# Patient Record
Sex: FEMALE | Race: WHITE | Employment: FULL TIME | URBAN - METROPOLITAN AREA
[De-identification: names, ages, dates, MRNs, and addresses within clinical notes are randomized per-mention and may not be internally consistent; named-entity substitution may affect disease eponyms.]

---

## 2017-06-05 RX ORDER — CITALOPRAM 20 MG/1
20 TABLET ORAL DAILY
Qty: 30 TABLET | Refills: 0 | Status: SHIPPED | OUTPATIENT
Start: 2017-06-05 | End: 2017-06-23 | Stop reason: ALTCHOICE

## 2017-06-23 ENCOUNTER — OFFICE VISIT (OUTPATIENT)
Dept: FAMILY MEDICINE CLINIC | Age: 22
End: 2017-06-23
Payer: COMMERCIAL

## 2017-06-23 VITALS
HEIGHT: 69 IN | RESPIRATION RATE: 18 BRPM | DIASTOLIC BLOOD PRESSURE: 72 MMHG | SYSTOLIC BLOOD PRESSURE: 120 MMHG | WEIGHT: 293 LBS | HEART RATE: 82 BPM | BODY MASS INDEX: 43.4 KG/M2

## 2017-06-23 DIAGNOSIS — G47.19 EXCESSIVE DAYTIME SLEEPINESS: ICD-10-CM

## 2017-06-23 DIAGNOSIS — E03.9 HYPOTHYROIDISM, UNSPECIFIED TYPE: ICD-10-CM

## 2017-06-23 DIAGNOSIS — R53.83 OTHER FATIGUE: ICD-10-CM

## 2017-06-23 DIAGNOSIS — Z51.81 MEDICATION MONITORING ENCOUNTER: ICD-10-CM

## 2017-06-23 DIAGNOSIS — Z00.01 ENCOUNTER FOR GENERAL ADULT MEDICAL EXAMINATION WITH ABNORMAL FINDINGS: Primary | ICD-10-CM

## 2017-06-23 DIAGNOSIS — Z30.09 ENCOUNTER FOR COUNSELING REGARDING CONTRACEPTION: ICD-10-CM

## 2017-06-23 DIAGNOSIS — E66.01 MORBID OBESITY DUE TO EXCESS CALORIES (HCC): ICD-10-CM

## 2017-06-23 DIAGNOSIS — Z13.220 SCREENING FOR LIPID DISORDERS: ICD-10-CM

## 2017-06-23 PROCEDURE — 99395 PREV VISIT EST AGE 18-39: CPT | Performed by: FAMILY MEDICINE

## 2017-06-23 RX ORDER — ETONOGESTREL AND ETHINYL ESTRADIOL 11.7; 2.7 MG/1; MG/1
1 INSERT, EXTENDED RELEASE VAGINAL
Qty: 3 EACH | Refills: 3 | Status: SHIPPED | OUTPATIENT
Start: 2017-06-23 | End: 2017-09-26

## 2017-06-23 RX ORDER — ETONOGESTREL AND ETHINYL ESTRADIOL 11.7; 2.7 MG/1; MG/1
1 INSERT, EXTENDED RELEASE VAGINAL
Qty: 3 EACH | Refills: 3 | Status: SHIPPED | OUTPATIENT
Start: 2017-06-23 | End: 2017-06-23 | Stop reason: SDUPTHER

## 2017-06-23 RX ORDER — CITALOPRAM 20 MG/1
TABLET ORAL
Refills: 0 | COMMUNITY
Start: 2017-06-05 | End: 2017-06-23

## 2017-06-23 ASSESSMENT — ENCOUNTER SYMPTOMS
EYE PAIN: 0
SHORTNESS OF BREATH: 0
ABDOMINAL DISTENTION: 0
COLOR CHANGE: 0
SORE THROAT: 0
BLOOD IN STOOL: 0
EYE DISCHARGE: 0
NAUSEA: 1
VOICE CHANGE: 0
FACIAL SWELLING: 0
RHINORRHEA: 0
EYE ITCHING: 0
CONSTIPATION: 0
EYE REDNESS: 0
PHOTOPHOBIA: 0
CHEST TIGHTNESS: 0
ABDOMINAL PAIN: 0
DIARRHEA: 0
BACK PAIN: 0
WHEEZING: 0
COUGH: 0
SINUS PRESSURE: 0
VOMITING: 0

## 2017-06-23 ASSESSMENT — PATIENT HEALTH QUESTIONNAIRE - PHQ9
SUM OF ALL RESPONSES TO PHQ QUESTIONS 1-9: 0
SUM OF ALL RESPONSES TO PHQ9 QUESTIONS 1 & 2: 0
2. FEELING DOWN, DEPRESSED OR HOPELESS: 0
1. LITTLE INTEREST OR PLEASURE IN DOING THINGS: 0

## 2017-06-26 ENCOUNTER — TELEPHONE (OUTPATIENT)
Dept: FAMILY MEDICINE CLINIC | Age: 22
End: 2017-06-26

## 2017-07-10 RX ORDER — CITALOPRAM 20 MG/1
TABLET ORAL
Qty: 90 TABLET | Refills: 3 | Status: SHIPPED | OUTPATIENT
Start: 2017-07-10 | End: 2017-09-26 | Stop reason: ALTCHOICE

## 2017-07-13 DIAGNOSIS — Z30.09 ENCOUNTER FOR COUNSELING REGARDING CONTRACEPTION: ICD-10-CM

## 2017-07-13 DIAGNOSIS — Z51.81 MEDICATION MONITORING ENCOUNTER: ICD-10-CM

## 2017-07-13 DIAGNOSIS — E66.01 MORBID OBESITY DUE TO EXCESS CALORIES (HCC): ICD-10-CM

## 2017-07-13 DIAGNOSIS — E03.9 HYPOTHYROIDISM, UNSPECIFIED TYPE: ICD-10-CM

## 2017-07-13 DIAGNOSIS — R53.83 OTHER FATIGUE: ICD-10-CM

## 2017-07-13 DIAGNOSIS — Z13.220 SCREENING FOR LIPID DISORDERS: ICD-10-CM

## 2017-07-13 LAB
ALBUMIN SERPL-MCNC: 3.9 G/DL
ALP BLD-CCNC: 48 U/L
ALT SERPL-CCNC: 17 U/L
AST SERPL-CCNC: 13 U/L
BILIRUB SERPL-MCNC: 0.5 MG/DL (ref 0.1–1.4)
BUN BLDV-MCNC: 10 MG/DL
CALCIUM SERPL-MCNC: 9.1 MG/DL
CHLORIDE BLD-SCNC: 109 MMOL/L
CHOLESTEROL, TOTAL: 162 MG/DL
CHOLESTEROL/HDL RATIO: 3.6
CO2: 20 MMOL/L
CORTISOL: 8.7
CREAT SERPL-MCNC: 0.65 MG/DL
GFR CALCULATED: 126
GLUCOSE BLD-MCNC: 93 MG/DL
HDLC SERPL-MCNC: 45 MG/DL (ref 35–70)
LDL CHOLESTEROL CALCULATED: 93 MG/DL (ref 0–160)
POTASSIUM SERPL-SCNC: 4.3 MMOL/L
SODIUM BLD-SCNC: 141 MMOL/L
T4 FREE: 1.2
TOTAL PROTEIN: 6.8
TRIGL SERPL-MCNC: 118 MG/DL
TSH SERPL DL<=0.05 MIU/L-ACNC: 3.37 UIU/ML
VLDLC SERPL CALC-MCNC: NORMAL MG/DL

## 2017-08-19 ENCOUNTER — HOSPITAL ENCOUNTER (OUTPATIENT)
Dept: SLEEP CENTER | Age: 22
Discharge: HOME | End: 2017-08-19
Payer: COMMERCIAL

## 2017-08-19 DIAGNOSIS — G47.33 OBSTRUCTIVE SLEEP APNEA SYNDROME: Primary | ICD-10-CM

## 2017-08-19 PROCEDURE — G0399 HOME SLEEP TEST/TYPE 3 PORTA: HCPCS

## 2017-09-11 DIAGNOSIS — R53.83 FATIGUE, UNSPECIFIED TYPE: ICD-10-CM

## 2017-09-11 DIAGNOSIS — G47.19 EXCESSIVE DAYTIME SLEEPINESS: ICD-10-CM

## 2017-09-26 ENCOUNTER — OFFICE VISIT (OUTPATIENT)
Dept: FAMILY MEDICINE CLINIC | Age: 22
End: 2017-09-26
Payer: COMMERCIAL

## 2017-09-26 VITALS
OXYGEN SATURATION: 97 % | HEART RATE: 83 BPM | WEIGHT: 293 LBS | DIASTOLIC BLOOD PRESSURE: 80 MMHG | BODY MASS INDEX: 43.4 KG/M2 | SYSTOLIC BLOOD PRESSURE: 124 MMHG | HEIGHT: 69 IN

## 2017-09-26 DIAGNOSIS — R51.9 HEADACHE, UNSPECIFIED HEADACHE TYPE: Primary | ICD-10-CM

## 2017-09-26 PROCEDURE — 99213 OFFICE O/P EST LOW 20 MIN: CPT | Performed by: FAMILY MEDICINE

## 2017-09-26 RX ORDER — PREDNISONE 50 MG/1
50 TABLET ORAL EVERY MORNING
Qty: 10 TABLET | Refills: 0 | Status: SHIPPED | OUTPATIENT
Start: 2017-09-26 | End: 2017-10-06

## 2017-09-26 RX ORDER — DIAZEPAM 2 MG/1
TABLET ORAL
Qty: 15 TABLET | Refills: 0 | Status: SHIPPED | OUTPATIENT
Start: 2017-09-26 | End: 2019-01-18 | Stop reason: ALTCHOICE

## 2017-09-26 RX ORDER — PROMETHAZINE HYDROCHLORIDE 12.5 MG/1
12.5-25 TABLET ORAL EVERY 6 HOURS PRN
Qty: 60 TABLET | Refills: 0 | Status: SHIPPED | OUTPATIENT
Start: 2017-09-26 | End: 2019-01-18 | Stop reason: ALTCHOICE

## 2017-09-26 RX ORDER — CEFDINIR 300 MG/1
300 CAPSULE ORAL 2 TIMES DAILY
Qty: 20 CAPSULE | Refills: 0 | Status: SHIPPED | OUTPATIENT
Start: 2017-09-26 | End: 2017-10-06

## 2017-09-26 ASSESSMENT — ENCOUNTER SYMPTOMS
DIARRHEA: 0
EYE ITCHING: 0
SORE THROAT: 0
SHORTNESS OF BREATH: 0
EYE DISCHARGE: 0
CONSTIPATION: 0
NAUSEA: 0
SINUS PRESSURE: 0
CHEST TIGHTNESS: 0
EYE REDNESS: 0
ABDOMINAL DISTENTION: 0
COLOR CHANGE: 0
VOICE CHANGE: 0
EYE PAIN: 0
BACK PAIN: 0
ABDOMINAL PAIN: 0
WHEEZING: 0
RHINORRHEA: 0
COUGH: 0
VOMITING: 0
BLOOD IN STOOL: 0
FACIAL SWELLING: 0
PHOTOPHOBIA: 0

## 2017-09-27 ENCOUNTER — TELEPHONE (OUTPATIENT)
Dept: FAMILY MEDICINE CLINIC | Age: 22
End: 2017-09-27

## 2017-09-27 DIAGNOSIS — R51.9 HEADACHE, UNSPECIFIED HEADACHE TYPE: Primary | ICD-10-CM

## 2017-09-29 ENCOUNTER — PATIENT MESSAGE (OUTPATIENT)
Dept: FAMILY MEDICINE CLINIC | Age: 22
End: 2017-09-29

## 2017-09-29 DIAGNOSIS — G43.909 MIGRAINE WITHOUT STATUS MIGRAINOSUS, NOT INTRACTABLE, UNSPECIFIED MIGRAINE TYPE: Primary | ICD-10-CM

## 2017-09-29 DIAGNOSIS — R51.9 HEADACHE, UNSPECIFIED HEADACHE TYPE: ICD-10-CM

## 2017-09-29 RX ORDER — SUMATRIPTAN 100 MG/1
100 TABLET, FILM COATED ORAL
Qty: 9 TABLET | Refills: 3 | Status: SHIPPED | OUTPATIENT
Start: 2017-09-29 | End: 2019-01-18 | Stop reason: ALTCHOICE

## 2017-09-30 ENCOUNTER — HOSPITAL ENCOUNTER (EMERGENCY)
Age: 22
Discharge: HOME | End: 2017-09-30
Attending: EMERGENCY MEDICINE
Payer: COMMERCIAL

## 2017-09-30 VITALS
BODY MASS INDEX: 43.4 KG/M2 | HEIGHT: 69 IN | RESPIRATION RATE: 16 BRPM | HEART RATE: 72 BPM | SYSTOLIC BLOOD PRESSURE: 140 MMHG | DIASTOLIC BLOOD PRESSURE: 77 MMHG | WEIGHT: 293 LBS | TEMPERATURE: 99 F | OXYGEN SATURATION: 97 %

## 2017-09-30 DIAGNOSIS — G89.29 CHRONIC NONINTRACTABLE HEADACHE, UNSPECIFIED HEADACHE TYPE: Primary | ICD-10-CM

## 2017-09-30 DIAGNOSIS — R51.9 CHRONIC NONINTRACTABLE HEADACHE, UNSPECIFIED HEADACHE TYPE: Primary | ICD-10-CM

## 2017-09-30 LAB
ABSOLUTE EOS #: 0 K/UL (ref 0–0.4)
ABSOLUTE LYMPH #: 1.2 K/UL (ref 1–4.8)
ABSOLUTE MONO #: 0.2 K/UL (ref 0.1–1.2)
ANION GAP SERPL CALCULATED.3IONS-SCNC: 13 MMOL/L (ref 9–17)
BASOPHILS # BLD: 0 %
BASOPHILS ABSOLUTE: 0 K/UL (ref 0–0.2)
BILIRUBIN URINE: NEGATIVE
BUN BLDV-MCNC: 15 MG/DL (ref 6–20)
BUN/CREAT BLD: ABNORMAL (ref 9–20)
C-REACTIVE PROTEIN: 3.6 MG/L (ref 0–5)
CALCIUM SERPL-MCNC: 9.5 MG/DL (ref 8.6–10.4)
CHLORIDE BLD-SCNC: 102 MMOL/L (ref 98–107)
CO2: 22 MMOL/L (ref 20–31)
COLOR: YELLOW
COMMENT UA: NORMAL
CREAT SERPL-MCNC: 0.6 MG/DL (ref 0.5–0.9)
DIFFERENTIAL TYPE: ABNORMAL
EOSINOPHILS RELATIVE PERCENT: 0 %
GFR AFRICAN AMERICAN: >60 ML/MIN
GFR NON-AFRICAN AMERICAN: >60 ML/MIN
GFR SERPL CREATININE-BSD FRML MDRD: ABNORMAL ML/MIN/{1.73_M2}
GFR SERPL CREATININE-BSD FRML MDRD: ABNORMAL ML/MIN/{1.73_M2}
GLUCOSE BLD-MCNC: 117 MG/DL (ref 70–99)
GLUCOSE URINE: NEGATIVE
HCG QUALITATIVE: NEGATIVE
HCT VFR BLD CALC: 42.9 % (ref 36–46)
HEMOGLOBIN: 14.3 G/DL (ref 12–16)
KETONES, URINE: NEGATIVE
LEUKOCYTE ESTERASE, URINE: NEGATIVE
LYMPHOCYTES # BLD: 10 %
MAGNESIUM: 2.3 MG/DL (ref 1.6–2.6)
MCH RBC QN AUTO: 29.1 PG (ref 26–34)
MCHC RBC AUTO-ENTMCNC: 33.3 G/DL (ref 31–37)
MCV RBC AUTO: 87.5 FL (ref 80–100)
MONOCYTES # BLD: 1 %
NITRITE, URINE: NEGATIVE
PDW BLD-RTO: 14.1 % (ref 12.5–15.4)
PH UA: 5.5 (ref 5–8)
PLATELET # BLD: 281 K/UL (ref 140–450)
PLATELET ESTIMATE: ABNORMAL
PMV BLD AUTO: 8.3 FL (ref 6–12)
POTASSIUM SERPL-SCNC: 4 MMOL/L (ref 3.7–5.3)
PROTEIN UA: NEGATIVE
RBC # BLD: 4.9 M/UL (ref 4–5.2)
RBC # BLD: ABNORMAL 10*6/UL
SEG NEUTROPHILS: 89 %
SEGMENTED NEUTROPHILS ABSOLUTE COUNT: 9.8 K/UL (ref 1.8–7.7)
SODIUM BLD-SCNC: 137 MMOL/L (ref 135–144)
SPECIFIC GRAVITY UA: 1.03 (ref 1–1.03)
TURBIDITY: CLEAR
URINE HGB: NEGATIVE
UROBILINOGEN, URINE: NORMAL
WBC # BLD: 11.2 K/UL (ref 3.5–11)
WBC # BLD: ABNORMAL 10*3/UL

## 2017-09-30 PROCEDURE — 99283 EMERGENCY DEPT VISIT LOW MDM: CPT

## 2017-09-30 PROCEDURE — 81003 URINALYSIS AUTO W/O SCOPE: CPT

## 2017-09-30 PROCEDURE — 83735 ASSAY OF MAGNESIUM: CPT

## 2017-09-30 PROCEDURE — 2580000003 HC RX 258: Performed by: EMERGENCY MEDICINE

## 2017-09-30 PROCEDURE — 86140 C-REACTIVE PROTEIN: CPT

## 2017-09-30 PROCEDURE — 80048 BASIC METABOLIC PNL TOTAL CA: CPT

## 2017-09-30 PROCEDURE — 96365 THER/PROPH/DIAG IV INF INIT: CPT

## 2017-09-30 PROCEDURE — 6360000002 HC RX W HCPCS: Performed by: EMERGENCY MEDICINE

## 2017-09-30 PROCEDURE — 96375 TX/PRO/DX INJ NEW DRUG ADDON: CPT

## 2017-09-30 PROCEDURE — 84703 CHORIONIC GONADOTROPIN ASSAY: CPT

## 2017-09-30 PROCEDURE — 85025 COMPLETE CBC W/AUTO DIFF WBC: CPT

## 2017-09-30 PROCEDURE — 96361 HYDRATE IV INFUSION ADD-ON: CPT

## 2017-09-30 RX ORDER — MAGNESIUM SULFATE 1 G/100ML
1 INJECTION INTRAVENOUS
Status: COMPLETED | OUTPATIENT
Start: 2017-09-30 | End: 2017-09-30

## 2017-09-30 RX ORDER — DIPHENHYDRAMINE HYDROCHLORIDE 50 MG/ML
25 INJECTION INTRAMUSCULAR; INTRAVENOUS ONCE
Status: COMPLETED | OUTPATIENT
Start: 2017-09-30 | End: 2017-09-30

## 2017-09-30 RX ORDER — 0.9 % SODIUM CHLORIDE 0.9 %
1000 INTRAVENOUS SOLUTION INTRAVENOUS ONCE
Status: COMPLETED | OUTPATIENT
Start: 2017-09-30 | End: 2017-09-30

## 2017-09-30 RX ADMIN — SODIUM CHLORIDE 1000 ML: 9 INJECTION, SOLUTION INTRAVENOUS at 14:05

## 2017-09-30 RX ADMIN — DIPHENHYDRAMINE HYDROCHLORIDE 25 MG: 50 INJECTION INTRAMUSCULAR; INTRAVENOUS at 14:05

## 2017-09-30 RX ADMIN — PROCHLORPERAZINE EDISYLATE 10 MG: 5 INJECTION INTRAMUSCULAR; INTRAVENOUS at 14:06

## 2017-09-30 RX ADMIN — MAGNESIUM SULFATE IN DEXTROSE 1 G: 10 INJECTION, SOLUTION INTRAVENOUS at 15:00

## 2017-09-30 RX ADMIN — MAGNESIUM SULFATE IN DEXTROSE 1 G: 10 INJECTION, SOLUTION INTRAVENOUS at 14:06

## 2017-09-30 ASSESSMENT — ENCOUNTER SYMPTOMS
WHEEZING: 0
PHOTOPHOBIA: 1
SHORTNESS OF BREATH: 0
BLOOD IN STOOL: 0
EYE REDNESS: 0
NAUSEA: 1
COUGH: 0
TROUBLE SWALLOWING: 0
BACK PAIN: 0
VOMITING: 0
ABDOMINAL PAIN: 0
SORE THROAT: 0
DIARRHEA: 0
CHEST TIGHTNESS: 0

## 2017-09-30 ASSESSMENT — PAIN DESCRIPTION - PAIN TYPE: TYPE: ACUTE PAIN

## 2017-09-30 ASSESSMENT — PAIN DESCRIPTION - DESCRIPTORS: DESCRIPTORS: STABBING;THROBBING

## 2017-09-30 ASSESSMENT — PAIN SCALES - GENERAL: PAINLEVEL_OUTOF10: 9

## 2017-09-30 ASSESSMENT — PAIN DESCRIPTION - LOCATION: LOCATION: HEAD

## 2017-09-30 NOTE — ED AVS SNAPSHOT
, and Debby Garcia DO. Follow-up Appointments    Below is a list of your follow-up and future appointments. This may not be a complete list as you may have made appointments directly with providers that we are not aware of or your providers may have made some for you. Please call your providers to confirm appointments. It is important to keep your appointments. Please bring your current insurance card, photo ID, co-pay, and all medication bottles to your appointment. If self-pay, payment is expected at the time of service. Follow-up Information     Follow up with Dionna Donahue MD.    Specialty:  Family Medicine    Contact information:    [address] [facility] [address] [facility] [address]          Go to [facility] ED. Specialty:  Emergency Medicine    Why:  If symptoms worsen    Contact information:    [address] [facility] [address]  [phone]      Preventive Care        Date Due    Tetanus Combination Vaccine (6 - Tdap) 11/8/2005    HIV screening is recommended for all people regardless of risk factors  aged 15-65 years at least once (lifetime) who have never been HIV tested. 11/8/2009    Pap Smear 11/8/2015    Yearly Flu Vaccine (1) 9/1/2017                 Care Plan Once You Return Home    This section includes instructions you will need to follow once you leave the hospital.  Your care team will discuss these with you, so you and those caring for you know how to best care for your health needs at home. This section may also include educational information about certain health topics that may be of help to you. Important Information if you smoke or are exposed to smoking       SMOKING: QUIT SMOKING. THIS IS THE MOST IMPORTANT ACTION YOU CAN TAKE TO IMPROVE YOUR CURRENT AND FUTURE HEALTH.      Call the [address] [place] [address] at [place] NOW ([phone]) The information on all pages of the After Visit Summary has been reviewed with me, the patient and/or responsible adult, by my health care provider(s). I had the opportunity to ask questions regarding this information. I understand I should dispose of my armband safely at home to protect my health information. A complete copy of the After Visit Summary has been given to me, the patient and/or responsible adult. Patient Signature/Responsible Adult: ___________________________________    Nurse Signature: ___________________________________  Resident/MLP Signature: ___________________________________  Attending Signature: ___________________________________    Date:____________Time:____________              Discharge Instructions            Headache: Care Instructions  Your Care Instructions    Headaches have many possible causes. Most headaches aren't a sign of a more serious problem, and they will get better on their own. Home treatment may help you feel better faster. The doctor has checked you carefully, but problems can develop later. If you notice any problems or new symptoms, get medical treatment right away. Follow-up care is a key part of your treatment and safety. Be sure to make and go to all appointments, and call your doctor if you are having problems. It's also a good idea to know your test results and keep a list of the medicines you take. How can you care for yourself at home? · Do not drive if you have taken a prescription pain medicine. · Rest in a quiet, dark room until your headache is gone. Close your eyes and try to relax or go to sleep. Don't watch TV or read. · Put a cold, moist cloth or cold pack on the painful area for 10 to 20 minutes at a time. Put a thin cloth between the cold pack and your skin. · Use a warm, moist towel or a heating pad set on low to relax tight shoulder and neck muscles. · Have someone gently massage your neck and shoulders. · Take pain medicines exactly as directed. ¨ If the doctor gave you a prescription medicine for pain, take it as prescribed. ¨ If you are not taking a prescription pain medicine, ask your doctor if you can take an over-the-counter medicine. · Be careful not to take pain medicine more often than the instructions allow, because you may get worse or more frequent headaches when the medicine wears off. · Do not ignore new symptoms that occur with a headache, such as a fever, weakness or numbness, vision changes, or confusion. These may be signs of a more serious problem. To prevent headaches  · Keep a headache diary so you can figure out what triggers your headaches. Avoiding triggers may help you prevent headaches. Record when each headache began, how long it lasted, and what the pain was like (throbbing, aching, stabbing, or dull). Write down any other symptoms you had with the headache, such as nausea, flashing lights or dark spots, or sensitivity to bright light or loud noise. Note if the headache occurred near your period. List anything that might have triggered the headache, such as certain foods (chocolate, cheese, wine) or odors, smoke, bright light, stress, or lack of sleep. · Find healthy ways to deal with stress. Headaches are most common during or right after stressful times. Take time to relax before and after you do something that has caused a headache in the past.  · Try to keep your muscles relaxed by keeping good posture. Check your jaw, face, neck, and shoulder muscles for tension, and try relaxing them. When sitting at a desk, change positions often, and stretch for 30 seconds each hour. · Get plenty of sleep and exercise. · Eat regularly and well. Long periods without food can trigger a headache. · Treat yourself to a massage. Some people find that regular massages are very helpful in relieving tension. · Limit caffeine by not drinking too much coffee, tea, or soda.  But don't

## 2017-09-30 NOTE — ED PROVIDER NOTES
[facility]  ED  eMERGENCY dEPARTMENT eNCOUnter   Attending Attestation     Pt Name: Amparo Travis  MRN: [id number]  [place] 1995  Date of evaluation: 9/30/17       Amparo Travis is a 24 y.o. female who presents with Headache      History: Pt presents with HA for two weeks. Has had CT, steroids, imitrex. Nothing has made it better. Pt is here for further evaluation. Exam: Physical Exam   Constitutional: She is oriented to person, place, and time and well-developed, well-nourished, and in no distress. Pt is obese. HENT:   Head: Normocephalic and atraumatic. Eyes: EOM are normal. Pupils are equal, round, and reactive to light. Right eye exhibits no discharge. Left eye exhibits no discharge. Neck: Normal range of motion. No JVD present. No tracheal deviation present. Cardiovascular: Normal rate, regular rhythm, normal heart sounds and intact distal pulses. Exam reveals no gallop and no friction rub. No murmur heard. Pulmonary/Chest: Effort normal and breath sounds normal. No respiratory distress. She has no wheezes. She has no rales. Abdominal: Soft. Bowel sounds are normal. She exhibits no distension and no mass. There is no tenderness. There is no rebound and no guarding. Musculoskeletal: Normal range of motion. She exhibits no edema or tenderness. Neurological: She is alert and oriented to person, place, and time. No cranial nerve deficit. Coordination normal. GCS score is 15. Skin: Skin is warm and dry. No rash noted. She is not diaphoretic. No erythema. Psychiatric: Mood and affect normal.     Will get labs, U/A, review CT from yesterday. Will consider LP for Idiopathic intracranial hypertension and admission if pt is not improved. Pt received compazine and benadryl, pt had akathisic reaction while I was in the room. Pt improved and felt improved after this.      I performed a history and physical examination of the patient and discussed management with the

## 2017-09-30 NOTE — ED NOTES
While Dr. Jajyay Dietrich was examining pt, pt started experiencing a lot of anxiety. Pt was crying and tachypneic and tachycardic at 120s. Per Dr. Jayjay Dietrich, possible adverse reaction to compazine administration. Writer diluted medication and administered slowly from the top IV port. Pt is now calm and resting comfortably. Pt is no long tachypneic or tachycardic. Will continue to monitor.        Norma Lee RN  09/30/17 1829

## 2017-09-30 NOTE — ED PROVIDER NOTES
[facility] ED  Emergency Department Encounter  Emergency Medicine Resident     Pt Name: Oma Rabago  MRN: [id number]  [place] 1995  Date of evaluation: 9/30/17  PCP:  Sharda Jones MD    [address] [place] [address]       Chief Complaint   Patient presents with    Headache       HISTORY OF PRESENT ILLNESS  (Location/Symptom, Timing/Onset, Context/Setting, Quality, Duration, Modifying Factors, Severity.)      Oma Rabago is a 24 y.o. female who presents with Headache. Patient states she's had a headache for the last 2 weeks now. She states she has followed up with her primary care doctor and tried multiple medications including antihistamines, sinus medications, steroids. She recently was placed on Imitrex and given outpatient referral to neurology who she has not yet had time to follow-up with. She also had an outpatient CT scan done of her brain which was negative. She denies any fevers or chills or neck pain. Denies any chest pain or shortness of breath. Denies any recent illnesses, coughs or colds. She denies any worsening of her headache with positional changes. She does note photophobia and nausea without vomiting. Denies any prior history of migraines prior to this episode that started 2 weeks ago. PAST MEDICAL / SURGICAL / SOCIAL / FAMILY HISTORY      has no past medical history on file. has a past surgical history that includes Tonsillectomy. Social History     Social History    Marital status: Single     Spouse name: N/A    Number of children: N/A    Years of education: N/A     Occupational History    Not on file. Social History Main Topics    Smoking status: Never Smoker    Smokeless tobacco: Never Used    Alcohol use 0.0 oz/week     0 Standard drinks or equivalent per week      Comment: social    Drug use: No    Sexual activity: Not on file     Other Topics Concern    Not on file     Social History Narrative       History reviewed.  No pertinent family systems reviewed and are negative. PHYSICAL EXAM   (up to 7 for level 4, 8 or more for level 5)      INITIAL VITALS:   BP (!) 140/77  Pulse 72  Temp 99 °F (37.2 °C) (Oral)   Resp 16  Ht 5' 9\" (1.753 m)  Wt (!) 400 lb (181.4 kg)  LMP 09/14/2017  SpO2 97%  BMI 59.07 kg/m2    Physical Exam   Constitutional: She is oriented to person, place, and time. She appears well-developed and well-nourished. No distress. HENT:   Head: Normocephalic and atraumatic. Nose: Nose normal.   Mouth/Throat: Oropharynx is clear and moist. No oropharyngeal exudate. Eyes: Conjunctivae and EOM are normal. Pupils are equal, round, and reactive to light. No scleral icterus. Neck: Normal range of motion. Neck supple. No JVD present. No tracheal deviation present. Cardiovascular: Normal rate, regular rhythm, normal heart sounds and intact distal pulses. Exam reveals no gallop and no friction rub. No murmur heard. Pulmonary/Chest: Breath sounds normal. No stridor. No respiratory distress. She has no wheezes. She has no rales. She exhibits no tenderness. Abdominal: Soft. Bowel sounds are normal. There is no tenderness. There is no rebound and no guarding. Musculoskeletal: Normal range of motion. She exhibits no edema or tenderness. Lymphadenopathy:     She has no cervical adenopathy. Neurological: She is alert and oriented to person, place, and time. She has normal reflexes. No cranial nerve deficit. She exhibits normal muscle tone. Coordination normal.   Skin: Skin is warm and dry. No rash noted. She is not diaphoretic. No erythema. No pallor. Psychiatric: She has a normal mood and affect. Her behavior is normal. Judgment and thought content normal.   Nursing note and vitals reviewed.       DIFFERENTIAL  DIAGNOSIS     PLAN (LABS / IMAGING / EKG):  Orders Placed This Encounter   Procedures    CBC WITH AUTO DIFFERENTIAL    BASIC METABOLIC PANEL    MAGNESIUM    C-REACTIVE PROTEIN    HCG Qualitative, Serum    UA W/REFLEX CULTURE       MEDICATIONS ORDERED:  Orders Placed This Encounter   Medications    0.9 % sodium chloride bolus    diphenhydrAMINE (BENADRYL) injection 25 mg    prochlorperazine (COMPAZINE) injection 10 mg    DISCONTD: magnesium sulfate 2 g in dextrose 5 % 100 mL IVPB    magnesium sulfate 1 g in dextrose 5% 100 mL IVPB       DDX: tension ha, migraine, IIH, sepsis, meningtitis, encephalitis    DIAGNOSTIC RESULTS / EMERGENCY DEPARTMENT COURSE / MDM     LABS:  Results for orders placed or performed during the hospital encounter of 09/30/17   CBC WITH AUTO DIFFERENTIAL   Result Value Ref Range    WBC 11.2 (H) 3.5 - 11.0 k/uL    RBC 4.90 4.0 - 5.2 m/uL    Hemoglobin 14.3 12.0 - 16.0 g/dL    Hematocrit 42.9 36 - 46 %    MCV 87.5 80 - 100 fL    MCH 29.1 26 - 34 pg    MCHC 33.3 31 - 37 g/dL    RDW 14.1 12.5 - 15.4 %    Platelets 301 871 - 465 k/uL    MPV 8.3 6.0 - 12.0 fL    Differential Type NOT REPORTED     Seg Neutrophils 89 %    Lymphocytes 10 %    Monocytes 1 %    Eosinophils % 0 %    Basophils 0 %    Segs Absolute 9.80 (H) 1.8 - 7.7 k/uL    Absolute Lymph # 1.20 1.0 - 4.8 k/uL    Absolute Mono # 0.20 0.1 - 1.2 k/uL    Absolute Eos # 0.00 0.0 - 0.4 k/uL    Basophils # 0.00 0.0 - 0.2 k/uL    WBC Morphology NOT REPORTED     RBC Morphology NOT REPORTED     Platelet Estimate NOT REPORTED    BASIC METABOLIC PANEL   Result Value Ref Range    Glucose 117 (H) 70 - 99 mg/dL    BUN 15 6 - 20 mg/dL    CREATININE 0.60 0.50 - 0.90 mg/dL    Bun/Cre Ratio NOT REPORTED 9 - 20    Calcium 9.5 8.6 - 10.4 mg/dL    Sodium 137 135 - 144 mmol/L    Potassium 4.0 3.7 - 5.3 mmol/L    Chloride 102 98 - 107 mmol/L    CO2 22 20 - 31 mmol/L    Anion Gap 13 9 - 17 mmol/L    GFR Non-African American >60 >60 mL/min    GFR African American >60 >60 mL/min    GFR Comment          GFR Staging NOT REPORTED    MAGNESIUM   Result Value Ref Range    Magnesium 2.3 1.6 - 2.6 mg/dL   C-REACTIVE PROTEIN   Result Value Ref Range    CRP 3.6 0.0 - 5.0 mg/L   HCG Qualitative, Serum   Result Value Ref Range    hCG Qual NEGATIVE NEG   UA W/REFLEX CULTURE   Result Value Ref Range    Color, UA YELLOW YEL    Turbidity UA CLEAR CLEAR    Glucose, Ur NEGATIVE NEG    Bilirubin Urine NEGATIVE NEG    Ketones, Urine NEGATIVE NEG    Specific Gravity, UA 1.028 1.005 - 1.030    Urine Hgb NEGATIVE NEG    pH, UA 5.5 5.0 - 8.0    Protein, UA NEGATIVE NEG    Urobilinogen, Urine Normal NORM    Nitrite, Urine NEGATIVE NEG    Leukocyte Esterase, Urine NEGATIVE NEG    Urinalysis Comments       Microscopic exam not performed based on chemical results unless requested in       IMPRESSION: 50-year-old female presents with headache. States it has been ongoing for last 2 weeks and she is has seen her PCP and gotten an outpatient CT scan which was negative. She states she tried multiple medications is currently starting Imitrex which has not been helpful. The headache itself varies in characterization however is typically a throbbing headache now located in the frontal portion of her head. She also notes photophobia and nausea without vomiting. Denies any fevers, recent neck pain or maximum intensity at onset. On exam, patient has a normal neurologic exam however is morbidly obese. Suspect this may be pseudotumor cerebri, as the patient's body habitus and lack of improvement with other outpatient medications 0.47. However we'll treat her here symptomatically and potentially do a lumbar puncture. RADIOLOGY:  None    EKG  None    All EKG's are interpreted by the Emergency Department Physician who either signs or Co-signs this chart in the absence of a cardiologist.    EMERGENCY DEPARTMENT COURSE:    Patient reports improvement after Benadryl, Compazine and magnesium. Labs within normal limits. She again was offered a lumbar puncture however declined.   She was also offered admission with neurology consultation and again declined instead saying she would rather follow up outpatient

## 2017-09-30 NOTE — ED TRIAGE NOTES
Pt to ED with c/o a headache that has been ongoing x2 weeks and continues to worsen. Pt states that she was seen at [facility] on 9/28/17 and had a CT scan of her head done. Pt states that they stated that it was a normal scan. Pt states she has also been to her PCP. Pt states that she was put on prednisone and imitrex for the pain. Pt states that she took two doses of imitrex yesterday with no relief. Pt states that she has had photophobia, dizziness, and nausea with the headache. Pt denies chest pain, SOB, or vomiting. Pt resting on cot in NAD with lights off, RR even and unlabored. Pt placed on continuous pulse ox and BP cuff. Pt a/o x4 and speaking in full sentences. Pt ambulatory to room with a steady gait. Awaiting further orders.

## 2017-09-30 NOTE — ED NOTES
Pt ambulatory with steady gait to [facility], RN  09/30/17 0014

## 2017-09-30 NOTE — ED PROVIDER NOTES
I was not involved in the care of this patient and I signed myself to this patient in error.       Remy Gil,   09/30/17 [id number]

## 2017-10-02 ENCOUNTER — PATIENT MESSAGE (OUTPATIENT)
Dept: FAMILY MEDICINE CLINIC | Age: 22
End: 2017-10-02

## 2017-10-02 RX ORDER — CALCIUM CARBONATE 300MG(750)
400 TABLET,CHEWABLE ORAL 2 TIMES DAILY
Qty: 60 TABLET | Refills: 0 | Status: SHIPPED | OUTPATIENT
Start: 2017-10-02 | End: 2019-01-18 | Stop reason: ALTCHOICE

## 2017-10-02 RX ORDER — PROCHLORPERAZINE 25 MG
25 SUPPOSITORY, RECTAL RECTAL EVERY 12 HOURS PRN
Qty: 60 SUPPOSITORY | Refills: 0 | Status: SHIPPED | OUTPATIENT
Start: 2017-10-02 | End: 2019-01-18 | Stop reason: ALTCHOICE

## 2017-10-02 RX ORDER — DIPHENHYDRAMINE HCL 25 MG
50 CAPSULE ORAL EVERY 12 HOURS
Qty: 120 CAPSULE | Refills: 0 | Status: SHIPPED | OUTPATIENT
Start: 2017-10-02 | End: 2017-11-01

## 2017-10-02 NOTE — TELEPHONE ENCOUNTER
I set up a referral to neurology last week, please see if there is any way we can expedite this, she has had a constant headache for 2 weeks and has been unable to work because of it.

## 2017-10-02 NOTE — TELEPHONE ENCOUNTER
From: Gay Kennedy  To: Jaime Gibson MD  Sent: 10/2/2017 1:29 PM EDT  Subject: Headache update     Hello, I am writing to update you. I tried the Imitrex Friday @4pm; it gave me the shakes and racing heart but no relief. My headache never got any better so I went to [facility]. V's Saturday afternoon. They gave me a \"Migraine Cocktail\" (Benadryl, Compazine and Magnesium Sulfate) for a few hours and my headache subsided for, I would say 24 hours. It came back Sunday night and I'm not feeling any better. I havent heard from anyone regarding a neurologist yet but I understand with the weekend it can push back a few days. If you have any other suggestions, please let me know.  ----- Message -----  From: Jaime Gibson MD  Sent: 9/29/2017 6:07 PM EDT  To: Gay Kennedy  Subject: RE: Test Results Question    I'm not sure if you've had opportunity to  the prescription that I sent in earlier and try it yet but I would also suggest starting to use some nasal saline spray, one puff in each nostril every 4 hours to moisten the mucus membranes and prevent any bleeding. With sinus infections it's not uncommon that there is some cracking and bleeding in the nasal passage which can result in significant nose bleeds if not addressed early on. Let me know if there is improvement with the Imitrex. Jaime Gibson MD       ----- Message -----   From: Gay Kennedy   Sent: 9/29/2017 1:58 PM EDT   To: Jaime Gibson MD  Subject: RE: Test Results Question    I hope there is something to help. Im in alot of pain. The prescriptions the dr gave me Tuesday arent doing anything. And now Im smelling & tasting blood. What should I do?  ----- Message -----  From: JUDI BILLINGSLEY  Sent: 9/29/2017 1:22 PM EDT  To: Gay Kennedy  Subject: RE: Test Results Question    Yes, we received them yesterday. Someone will be in contract with you regarding your results.      Hampton Bores    ----- Message -----   From: Gay Kennedy   Sent:

## 2017-10-09 ENCOUNTER — PATIENT MESSAGE (OUTPATIENT)
Dept: FAMILY MEDICINE CLINIC | Age: 22
End: 2017-10-09

## 2017-10-09 NOTE — TELEPHONE ENCOUNTER
Please review the [employer] message. She was asking about a different neurologist and I don't have a preference for one over another just whoever can get her in the fastest.  She has not heard anything back about scheduling with neurology yet and I'd ordered it about 2 weeks ago.

## 2017-10-09 NOTE — TELEPHONE ENCOUNTER
to test that theory if you wanted would be to take 2 OTC caffeine tablets. Lowering of intracranial pressure is best achieved though with acetazolamide (which is more commonly used for altitude sickness). In regard to the prescriptions that I sent in earlier, of the three things that they gave you I think that the magnesium is probably what lead to the headache relief, the other two are basically just sedatives. Magnesium is helpful in headaches because it relaxes muscles and both the exam findings when I last saw you as well as what they documented in the ER notes seemed to point to occipital neuralgia from muscle tension in the neck. If you wanted to try the magnesium alone and have the pharmacy hold onto the other two, it may be worth doing. We are trying though to expedite an appointment with neurology. Raman Romero MD      ----- Message -----   From: Monico Rubio   Sent: 10/2/2017 2:55 PM EDT   To: Raman Romero MD  Subject: Headache update    I can't take the Compazine is gave me a really bad panic/anxiety attack. I really dont want to keep taking all these medications that just give me different/new side effects and spending money. Is a neurologist still an option? The hospital thinks I may have \"idiopathic intracranial hypertension\" is there anything I can do for that?  ----- Message -----  From: Raman Romero MD  Sent: 10/2/2017 2:14 PM EDT  To: Monico Rubio  Subject: RE: Headache update    Since that combination seemed to work, I sent in prescriptions for all three in oral form. The combination may cause drowsiness and the magnesium may have to be increased to two pills twice daily of initially ineffective but the magnesium should be started low because it can cause diarrhea. Let me know if this is helpful after trying the first dose. Raman Romero MD      ----- Message -----   From: Monico Rubio   Sent: 10/2/2017 1:29 PM EDT   To:  Raman Romero MD  Subject: Headache update     Hello, I am writing to update you. I tried the Imitrex Friday @4pm; it gave me the shakes and racing heart but no relief. My headache never got any better so I went to [facility]. V's Saturday afternoon. They gave me a \"Migraine Cocktail\" (Benadryl, Compazine and Magnesium Sulfate) for a few hours and my headache subsided for, I would say 24 hours. It came back Sunday night and I'm not feeling any better. I havent heard from anyone regarding a neurologist yet but I understand with the weekend it can push back a few days. If you have any other suggestions, please let me know.  ----- Message -----  From: Germania Martin MD  Sent: 9/29/2017 6:07 PM EDT  To: Keyona Gramajo  Subject: RE: Test Results Question    I'm not sure if you've had opportunity to  the prescription that I sent in earlier and try it yet but I would also suggest starting to use some nasal saline spray, one puff in each nostril every 4 hours to moisten the mucus membranes and prevent any bleeding. With sinus infections it's not uncommon that there is some cracking and bleeding in the nasal passage which can result in significant nose bleeds if not addressed early on. Let me know if there is improvement with the Imitrex. Germania Martin MD       ----- Message -----   From: Keyona Gramajo   Sent: 9/29/2017 1:58 PM EDT   To: Germania Martin MD  Subject: RE: Test Results Question    I hope there is something to help. Im in alot of pain. The prescriptions the dr gave me Tuesday arent doing anything. And now Im smelling & tasting blood. What should I do?  ----- Message -----  From: JUDI BILLINGSLEY  Sent: 9/29/2017 1:22 PM EDT  To: Keyona Gramajo  Subject: RE: Test Results Question    Yes, we received them yesterday. Someone will be in contract with you regarding your results. Blanca Armstrong    ----- Message -----    From: Keyona Gramajo   Sent: 9/29/2017 11:47 AM EDT   To:  Germania Martin MD  Subject: Test Results Question    I tried calling but no one was willing to help me. Did my CT results come back?

## 2017-10-13 DIAGNOSIS — G43.909 MIGRAINE WITHOUT STATUS MIGRAINOSUS, NOT INTRACTABLE, UNSPECIFIED MIGRAINE TYPE: Primary | ICD-10-CM

## 2017-10-25 ENCOUNTER — PATIENT MESSAGE (OUTPATIENT)
Dept: FAMILY MEDICINE CLINIC | Age: 22
End: 2017-10-25

## 2017-10-25 NOTE — TELEPHONE ENCOUNTER
From: Fred Aguirre  To: Dionna Donahue MD  Sent: 10/25/2017 11:52 AM EDT  Subject: Non-Urgent Medical Question    Hello, this isn't a medical question but i keep getting billed for the sleep test for $600+ when it was to be free. i don't know what is happening this it, but i don't want to get in trouble with it because the hospital messed up the billing. Im not sure who i need to call to get it figured out so i thought you could help since you took a copy of the bill and would look into it. please let me know what i should do.

## 2017-10-26 RX ORDER — DOXYCYCLINE HYCLATE 100 MG
100 TABLET ORAL 2 TIMES DAILY
Qty: 20 TABLET | Refills: 0 | Status: SHIPPED | OUTPATIENT
Start: 2017-10-26 | End: 2018-01-19 | Stop reason: SDUPTHER

## 2017-10-26 RX ORDER — PROMETHAZINE HYDROCHLORIDE AND CODEINE PHOSPHATE 6.25; 1 MG/5ML; MG/5ML
5-10 SYRUP ORAL EVERY 6 HOURS PRN
Qty: 240 ML | Refills: 0 | Status: SHIPPED | OUTPATIENT
Start: 2017-10-26 | End: 2017-11-02

## 2017-10-26 RX ORDER — PREDNISONE 50 MG/1
50 TABLET ORAL DAILY
Qty: 10 TABLET | Refills: 0 | Status: SHIPPED | OUTPATIENT
Start: 2017-10-26 | End: 2017-11-05

## 2017-11-06 ENCOUNTER — PATIENT MESSAGE (OUTPATIENT)
Dept: FAMILY MEDICINE CLINIC | Age: 22
End: 2017-11-06

## 2017-11-06 DIAGNOSIS — F41.1 GAD (GENERALIZED ANXIETY DISORDER): Primary | ICD-10-CM

## 2017-11-06 RX ORDER — LORAZEPAM 0.5 MG/1
0.5 TABLET ORAL EVERY 8 HOURS PRN
Qty: 45 TABLET | Refills: 0 | Status: SHIPPED | OUTPATIENT
Start: 2017-11-06 | End: 2017-12-06

## 2017-11-06 RX ORDER — CITALOPRAM 20 MG/1
TABLET ORAL
Qty: 90 TABLET | Refills: 1 | Status: SHIPPED | OUTPATIENT
Start: 2017-11-06 | End: 2018-05-06 | Stop reason: SDUPTHER

## 2017-11-16 ENCOUNTER — PATIENT MESSAGE (OUTPATIENT)
Dept: FAMILY MEDICINE CLINIC | Age: 22
End: 2017-11-16

## 2017-11-30 ENCOUNTER — PATIENT MESSAGE (OUTPATIENT)
Dept: FAMILY MEDICINE CLINIC | Age: 22
End: 2017-11-30

## 2017-12-06 ENCOUNTER — PATIENT MESSAGE (OUTPATIENT)
Dept: FAMILY MEDICINE CLINIC | Age: 22
End: 2017-12-06

## 2017-12-21 ENCOUNTER — PATIENT MESSAGE (OUTPATIENT)
Dept: FAMILY MEDICINE CLINIC | Age: 22
End: 2017-12-21

## 2017-12-22 NOTE — TELEPHONE ENCOUNTER
From: Fred Aguirre  To: Dionna Donahue MD  Sent: 12/21/2017 6:39 PM EST  Subject: RE: Non-Urgent Medical Question    Any updates? ?  ----- Message -----  From: Marilyn Pascale  Sent: 12/8/2017 2:41 PM EST  To: Fred Aguirre  Subject: RE: RE: Non-Urgent Medical Question  Yonatan Boss 87,    I tried calling you this afternoon. Dr. Brayan Kumar and I are still disputing this. I will try calling again on Monday. Thanks  Margareth frost    ----- Message -----   From: Fred Aguirre   Sent: 12/7/2017 11:35 AM EST   To: Dionna Donahue MD  Subject: RE: RE: Non-Urgent Medical Question    Margareth frost hasnt gotren back to me about this matter    ----- Message -----  From: Marcelo Cho  Sent: 12/7/17 8:18 AM  To: Fred Aguirre  Subject: RE: Non-Urgent Medical Question    Good morning,     You can call [facility] billing at [phone] or you can call the office and talk to our supervisor which is Margareth frost and tell her what's going on. Lorane Hodgkin    ----- Message -----   From: Fred Aguirre   Sent: 12/6/2017 9:27 PM EST   To: Dionna Donahue MD  Subject: Non-Urgent Medical Question    In regards to the sleep test you ordered in August, that you said was FREE, I'm still getting bills for over $600, even after i showed you the bill. I dont understand what is going on but I dont believe i should owe this since I was told it would be ZERO cost to me. I have been waiting to hear back from your office and [facility] about this matter. I will also try to call tomorrow to get this figured out.

## 2018-01-03 ENCOUNTER — PATIENT MESSAGE (OUTPATIENT)
Dept: FAMILY MEDICINE CLINIC | Age: 23
End: 2018-01-03

## 2018-01-22 RX ORDER — DOXYCYCLINE HYCLATE 100 MG
TABLET ORAL
Qty: 20 TABLET | Refills: 0 | Status: SHIPPED | OUTPATIENT
Start: 2018-01-22 | End: 2019-01-18 | Stop reason: ALTCHOICE

## 2018-01-22 NOTE — TELEPHONE ENCOUNTER
Next Visit Date:  No future appointments. Health Maintenance   Topic Date Due    DTaP/Tdap/Td vaccine (6 - Tdap) 11/08/2005    HIV screen  11/08/2009    Chlamydia screen  11/08/2010    Cervical cancer screen  11/08/2015    Flu vaccine (1) 09/01/2017       Hemoglobin A1C (%)   Date Value   06/07/2016 5.6             ( goal A1C is < 7)   No results found for: LABMICR  LDL Calculated (mg/dL)   Date Value   07/13/2017 93       (goal LDL is <100)   AST (U/L)   Date Value   07/13/2017 13     ALT (U/L)   Date Value   07/13/2017 17     BUN (mg/dL)   Date Value   09/30/2017 15     BP Readings from Last 3 Encounters:   09/30/17 (!) 140/77   09/26/17 124/80   06/23/17 120/72          (goal 120/80)    All Future Testing planned in [facility]              There is no problem list on file for this patient.

## 2018-03-02 ENCOUNTER — PATIENT MESSAGE (OUTPATIENT)
Dept: FAMILY MEDICINE CLINIC | Age: 23
End: 2018-03-02

## 2018-05-06 DIAGNOSIS — F41.1 GAD (GENERALIZED ANXIETY DISORDER): ICD-10-CM

## 2018-05-07 RX ORDER — CITALOPRAM 20 MG/1
TABLET ORAL
Qty: 90 TABLET | Refills: 1 | Status: SHIPPED | OUTPATIENT
Start: 2018-05-07 | End: 2018-11-12 | Stop reason: SDUPTHER

## 2018-05-08 DIAGNOSIS — F41.1 GAD (GENERALIZED ANXIETY DISORDER): ICD-10-CM

## 2018-05-08 RX ORDER — CITALOPRAM 20 MG/1
TABLET ORAL
Qty: 90 TABLET | Refills: 1 | Status: SHIPPED | OUTPATIENT
Start: 2018-05-08 | End: 2019-01-18 | Stop reason: SDUPTHER

## 2018-11-12 DIAGNOSIS — F41.1 GAD (GENERALIZED ANXIETY DISORDER): ICD-10-CM

## 2018-11-13 RX ORDER — CITALOPRAM 20 MG/1
TABLET ORAL
Qty: 90 TABLET | Refills: 1 | Status: SHIPPED | OUTPATIENT
Start: 2018-11-13 | End: 2019-05-12 | Stop reason: SDUPTHER

## 2019-01-18 ENCOUNTER — OFFICE VISIT (OUTPATIENT)
Dept: FAMILY MEDICINE CLINIC | Age: 24
End: 2019-01-18
Payer: COMMERCIAL

## 2019-01-18 VITALS
RESPIRATION RATE: 18 BRPM | OXYGEN SATURATION: 97 % | HEART RATE: 92 BPM | SYSTOLIC BLOOD PRESSURE: 130 MMHG | WEIGHT: 293 LBS | TEMPERATURE: 98.5 F | BODY MASS INDEX: 58.92 KG/M2 | DIASTOLIC BLOOD PRESSURE: 80 MMHG

## 2019-01-18 DIAGNOSIS — R09.82 PND (POST-NASAL DRIP): ICD-10-CM

## 2019-01-18 DIAGNOSIS — R05.8 POST-VIRAL COUGH SYNDROME: Primary | ICD-10-CM

## 2019-01-18 PROCEDURE — G8417 CALC BMI ABV UP PARAM F/U: HCPCS | Performed by: NURSE PRACTITIONER

## 2019-01-18 PROCEDURE — 99213 OFFICE O/P EST LOW 20 MIN: CPT | Performed by: NURSE PRACTITIONER

## 2019-01-18 PROCEDURE — G8427 DOCREV CUR MEDS BY ELIG CLIN: HCPCS | Performed by: NURSE PRACTITIONER

## 2019-01-18 PROCEDURE — G8484 FLU IMMUNIZE NO ADMIN: HCPCS | Performed by: NURSE PRACTITIONER

## 2019-01-18 PROCEDURE — 1036F TOBACCO NON-USER: CPT | Performed by: NURSE PRACTITIONER

## 2019-01-18 RX ORDER — PREDNISONE 20 MG/1
40 TABLET ORAL DAILY
Qty: 10 TABLET | Refills: 0 | Status: SHIPPED | OUTPATIENT
Start: 2019-01-18 | End: 2019-01-23

## 2019-01-18 RX ORDER — TOPIRAMATE 100 MG/1
1 TABLET, FILM COATED ORAL
COMMUNITY
Start: 2018-07-13 | End: 2022-08-08 | Stop reason: CLARIF

## 2019-01-18 RX ORDER — CITALOPRAM 20 MG/1
20 TABLET ORAL
COMMUNITY
Start: 2017-07-10 | End: 2019-05-13

## 2019-01-18 ASSESSMENT — ENCOUNTER SYMPTOMS
VOICE CHANGE: 0
SHORTNESS OF BREATH: 0
FACIAL SWELLING: 0
SORE THROAT: 0
COUGH: 1
CHEST TIGHTNESS: 0
RHINORRHEA: 1
SINUS PRESSURE: 1
ALLERGIC/IMMUNOLOGIC NEGATIVE: 1
SINUS PAIN: 0
TROUBLE SWALLOWING: 0
VOMITING: 0
DIARRHEA: 1
WHEEZING: 0
NAUSEA: 0
ABDOMINAL PAIN: 0
EYES NEGATIVE: 1
COLOR CHANGE: 0

## 2019-02-15 ENCOUNTER — PATIENT MESSAGE (OUTPATIENT)
Dept: FAMILY MEDICINE CLINIC | Age: 24
End: 2019-02-15

## 2019-02-15 RX ORDER — CEFDINIR 300 MG/1
300 CAPSULE ORAL 2 TIMES DAILY
Qty: 20 CAPSULE | Refills: 0 | Status: SHIPPED | OUTPATIENT
Start: 2019-02-15 | End: 2019-02-25

## 2019-02-15 RX ORDER — PREDNISONE 20 MG/1
TABLET ORAL
Qty: 18 TABLET | Refills: 0 | Status: SHIPPED | OUTPATIENT
Start: 2019-02-15 | End: 2019-02-25

## 2019-03-12 ENCOUNTER — PATIENT MESSAGE (OUTPATIENT)
Dept: FAMILY MEDICINE CLINIC | Age: 24
End: 2019-03-12

## 2019-03-13 RX ORDER — PREDNISONE 20 MG/1
TABLET ORAL
Qty: 18 TABLET | Refills: 0 | Status: SHIPPED | OUTPATIENT
Start: 2019-03-13 | End: 2019-03-23

## 2019-03-13 RX ORDER — AMOXICILLIN AND CLAVULANATE POTASSIUM 875; 125 MG/1; MG/1
1 TABLET, FILM COATED ORAL 2 TIMES DAILY
Qty: 20 TABLET | Refills: 0 | Status: SHIPPED | OUTPATIENT
Start: 2019-03-13 | End: 2019-03-23

## 2019-05-12 DIAGNOSIS — F41.1 GAD (GENERALIZED ANXIETY DISORDER): ICD-10-CM

## 2019-05-13 RX ORDER — CITALOPRAM 20 MG/1
TABLET ORAL
Qty: 90 TABLET | Refills: 3 | Status: SHIPPED | OUTPATIENT
Start: 2019-05-13 | End: 2022-08-08 | Stop reason: CLARIF

## 2019-05-13 NOTE — TELEPHONE ENCOUNTER
Next Visit Date:  No future appointments. Health Maintenance   Topic Date Due    Varicella Vaccine (2 of 2 - 2-dose childhood series) 11/08/1998    DTaP/Tdap/Td vaccine (6 - Tdap) 11/08/2005    HPV vaccine (1 - Female 3-dose series) 11/08/2009    HIV screen  11/08/2009    Chlamydia screen  11/08/2010    Cervical cancer screen  11/08/2015    Flu vaccine (Season Ended) 09/01/2019    Pneumococcal 0-64 years Vaccine  Aged Out       Hemoglobin A1C (%)   Date Value   06/07/2016 5.6             ( goal A1C is < 7)   No results found for: LABMICR  LDL Calculated (mg/dL)   Date Value   07/13/2017 93   06/07/2016 104       (goal LDL is <100)   AST (U/L)   Date Value   07/13/2017 13     ALT (U/L)   Date Value   07/13/2017 17     BUN (mg/dL)   Date Value   09/30/2017 15     BP Readings from Last 3 Encounters:   01/18/19 130/80   09/30/17 (!) 140/77   09/26/17 124/80          (goal 120/80)    All Future Testing planned in [facility]              There is no problem list on file for this patient.

## 2022-01-13 ENCOUNTER — HOSPITAL ENCOUNTER (OUTPATIENT)
Age: 27
Discharge: HOME | End: 2022-01-13

## 2022-01-13 PROCEDURE — 86787 VARICELLA-ZOSTER ANTIBODY: CPT

## 2022-01-13 PROCEDURE — 86481 TB AG RESPONSE T-CELL SUSP: CPT

## 2022-01-13 PROCEDURE — 36415 COLL VENOUS BLD VENIPUNCTURE: CPT

## 2022-01-14 ENCOUNTER — NURSE ONLY (OUTPATIENT)
Dept: BARIATRICS/WEIGHT MGMT | Age: 27
End: 2022-01-14

## 2022-01-14 VITALS
BODY MASS INDEX: 41.95 KG/M2 | HEART RATE: 74 BPM | HEIGHT: 70 IN | RESPIRATION RATE: 20 BRPM | DIASTOLIC BLOOD PRESSURE: 80 MMHG | WEIGHT: 293 LBS | SYSTOLIC BLOOD PRESSURE: 132 MMHG

## 2022-01-14 DIAGNOSIS — E66.01 MORBID OBESITY DUE TO EXCESS CALORIES (HCC): Primary | ICD-10-CM

## 2022-01-14 LAB — VZV IGG SER QL IA: 0.14

## 2022-01-14 NOTE — PATIENT INSTRUCTIONS
HMR Clinical Induction for client requesting [employer] Diet without medical supervision    Pramod Clay is a 31 y.o. female  1995      Guzman Harrington MD  Patient agreeable to communications with PCP. Body mass index is 68.41 kg/m². Type of shake 120    Minimum food prescription of 3 shakes + 2 entrees + 5 vegetalbe/fruits and 64 ounces of non-caloric fluids reviewed with patient. Advised on caffeine use in moderation if at all. Reviewed how to order food. Patient verbalized understanding.      Important Information Sheet NA    Class schedule reviewed

## 2022-01-14 NOTE — LETTER
[facility]  [place] [address] [place] [address]  Phone: [phone]  Fax: [phone]  [email or web address]    22     Trinidad Cavazosine, [address] [place] [address] [address]  [address] [facility]    Patient Los Roland    1995      Your patient has decided to participate in the Healthy RIVS Weight Loss Program at [facility]. This program combines a structured diet plan with lifestyle education to help patients in their weight-loss and weight management efforts. Participants learn about calories, physical activity, and other healthy lifestyle behaviors. The attached [facility] sheet provides information about the program.      Your client was seen at our clinic for a medical screening and the following medical data was obtained:   Vitals:    22 0932   BP: 132/80   Pulse: 74   Resp: 20    Body mass index is 68.41 kg/m². Although there are medical resources available at this facility, we do not provide medical supervision during your patients participation in this Healthy RIVS Diet. Your patient has agreed to consult with you regarding any medical follow-up needed. We are please to be able to provide your patient with the opportunity for health improvement through weight loss with a well-balanced diet, exercise, and healthy lifestyle changes. Please contact me at [phone] with any questions or concerns.      Sincerely,    Kinsey Klein MA

## 2022-01-17 LAB — T-SPOT TB TEST: NORMAL

## 2022-08-02 ENCOUNTER — TELEPHONE (OUTPATIENT)
Dept: BARIATRICS/WEIGHT MGMT | Age: 27
End: 2022-08-02

## 2022-08-02 NOTE — TELEPHONE ENCOUNTER
Left pt vm that outstanding charge on account has been resolved, does not owe $100 for [facility] 7/21/22

## 2022-08-08 ENCOUNTER — OFFICE VISIT (OUTPATIENT)
Dept: OBGYN CLINIC | Age: 27
End: 2022-08-08
Payer: COMMERCIAL

## 2022-08-08 VITALS
BODY MASS INDEX: 41.95 KG/M2 | HEIGHT: 70 IN | DIASTOLIC BLOOD PRESSURE: 84 MMHG | WEIGHT: 293 LBS | SYSTOLIC BLOOD PRESSURE: 112 MMHG

## 2022-08-08 DIAGNOSIS — Z01.419 ENCOUNTER FOR WELL WOMAN EXAM: Primary | ICD-10-CM

## 2022-08-08 PROBLEM — E28.2 POLYCYSTIC OVARIES: Status: ACTIVE | Noted: 2022-08-08

## 2022-08-08 PROBLEM — E11.65 HYPERGLYCEMIA DUE TO TYPE 2 DIABETES MELLITUS (HCC): Status: ACTIVE | Noted: 2022-08-08

## 2022-08-08 PROCEDURE — 99385 PREV VISIT NEW AGE 18-39: CPT | Performed by: STUDENT IN AN ORGANIZED HEALTH CARE EDUCATION/TRAINING PROGRAM

## 2022-08-08 NOTE — PROGRESS NOTES
[facility]  [address] [facility] [place], [address] [facility]      Retia 2023                       Primary Care Physician: Teresa Felipe MD    CC:   Chief Complaint   Patient presents with    [facility]    Annual Exam     Last pap 21-WNL -LMP 22    Infertility     TTC for last 2yrs 2020 - using OPK, SA normal ?motility          HPI: Retia Monday is a 31 y.o. female  No LMP recorded. The patient was seen and examined. She is here for an annual visit. She is complaining of infertility. Patient admits to regular periods coming roughly every 35 to 40 days. They are very heavy on the first couple of days requiring a super tampon change roughly every hour. Her periods last about 5 days. Patient denies any history of gonorrhea or chlamydia. She does admit to about a 90 pound weight loss over the past year. Prior to attempting conception, patient was on the Nexplanon and birth control pills. She has been attempting pregnancy for over 2 years now. Her  did have a recent semen analysis which showed poor progressive motility. He is following with a urologist at [facility]. Recommend continued follow-up at [facility] and can consider further work-up should his work-up come back completely negative. Her bowel habits are regular. She denies any bloating. She denies dysuria. She denies urinary leaking. She denies vaginal discharge. She is sexually active with single partner, contraception - none. She uses no method for contraception and is  desiring pregnancy.     Depression Screen: Symptoms of decreased mood absent  Symptoms of anhedonia absent  **If either question is answered in a  positive fashion then complete the PHQ9 Scoring Evaluation and make the appropriate referral**    REVIEW OF SYSTEMS:   Constitutional: negative fever, negative chills  HEENT: negative visual disturbances, negative headaches  Respiratory: negative dyspnea, negative cough  Cardiovascular: negative chest pain,  negative palpitations  Gastrointestinal: negative abdominal pain, negative RUQ pain, negative N/V, negative diarrhea, negative constipation  Genitourinary: negative dysuria, negative vaginal discharge  Dermatological: negative rash  Hematologic: negative bruising  Immunologic/Lymphatic: negative recent illness, negative recent sick contact  Musculoskeletal: negative back pain, negative myalgias, negative arthralgias  Neurological:  negative dizziness, negative weakness  Behavior/Psych: negative depression, negative anxiety      GYNECOLOGICAL HISTORY:  Age of Menarche: 14  Age of Menopause: N/A     STD History: no past history    Permanent Sterilization: no  Reversible Birth Control: no  Hormone Replacement Exposure: no    OBSTETRICAL HISTORY:  OB History    Para Term  AB Living   0 0 0 0 0 0   SAB IAB Ectopic Molar Multiple Live Births   0 0 0 0 0 0       PAST MEDICAL HISTORY:   has no past medical history on file. PAST SURGICAL HISTORY:   has a past surgical history that includes Tonsillectomy. ALLERGIES:  has No Known Allergies. MEDICATIONS:  Prior to Admission medications    Medication Sig Start Date End Date Taking? Authorizing Provider   FOLIC ACID PO Take by mouth   Yes Historical Provider, MD   Prenatal Vit-Fe Fumarate-FA (PRENATAL PO) Take by mouth   Yes Historical Provider, MD       FAMILY HISTORY:  Family History of Breast, Ovarian, Colon or Uterine Cancer: No   family history includes Cancer in her paternal grandmother; Diabetes in her father, mother, and paternal grandmother; Heart Attack in her father; Heart Disease in her maternal grandfather; Hypertension in her father and mother; No Known Problems in her maternal grandmother. SOCIAL HISTORY:   reports that she has been smoking cigarettes. She has never used smokeless tobacco. She reports current alcohol use. She reports that she does not use drugs.     HEALTH MAINTENANCE:  Immunization status: up to date and documented  Garidisil immunization: discussed    ROUTINE GYN HEALTH MAINTENANCE  Mammogram: N/A  Colonoscopy: N/A  Pap Smears: Last  Neg. Next due . Family Planning: None  DEXA: N/A    VITALS:  Vitals:    22 1432   BP: 112/84   Site: Left Upper Arm   Position: Sitting   Cuff Size: Large Adult   Weight: (!) 385 lb 9.6 oz (174.9 kg)   Height: 5' 9.5\" (1.765 m)                                                                                                                                                                                                   PHYSICAL EXAM:   General Appearance: Appears healthy. Alert; in no acute distress. Pleasant. Skin: Skin color, texture, turgor normal. No rashes or lesions. HEENT: normocephalic and atraumatic, Thyroid normal to inspection and palpation  Respiratory: Normal expansion. Clear to auscultation. No rales, rhonchi, or wheezing. Cardiovascular: normal rate, normal S1 and S2, no gallops, intact distal pulses and no carotid bruits  Breast:  (Chest): N/A  Abdomen: soft, non-tender, non-distended, no right upper quadrant tenderness and no CVA tenderness  Pelvic Exam: Declined by patient as she is on her menses  Rectal Exam: exam declined by patient  Musculoskeletal: no gross abnormalities  Extremities: non-tender BLE and non-edematous  Psych:  oriented to time, place and person     DATA:  No results found for this visit on 22. ASSESSMENT & PLAN:    Claudio Tavera is a 31 y.o. female  No LMP recorded. - She undecided the Garidisil immunization    Annual:  Mammogram: N/A  Colonoscopy: N/A  Pap Smears: Last  Neg. Next due .   Family Planning: None  DEXA: N/A    Infertility   - SA abnormal with poor progressive motility per patient report   - Follow up with [facility] Urologist    Patient Active Problem List    Diagnosis Date Noted    Polycystic ovaries 2022     Priority: Medium Hyperglycemia due to type 2 diabetes mellitus ([facility] [address] 08/08/2022     Priority: Medium       Return in about 1 year (around 8/8/2023) for Annual.  No Patient Care Coordination Note on file. Counseling Completed:    Discussed need for repeat pap as per American Society for Colposcopy and Cervical Pathology guidelines. Discussed need for mammograms every 1 year, If >44 yo and last mammogram was negative. Discussed Calcium and Vitamin D dosing. Discussed need for colonoscopy screening as well as onset for bone density testing. Discussed birth control and barrier recommendations. Discussed STD counseling and prevention. Discussed Gardisil counseling for all patients 10-37 yo. Hereditary Breast, Ovarian, Colon and Uterine Cancer screening discussed. Tobacco & Secondary smoke risks discussed; with recommendation for cessation and avoidance. Routine health maintenance per patients PCP discussed. Diagnosis Orders   1.  Encounter for well woman exam             Elmer Chun 27, [facility]  8/8/2022, 5:27 PM

## 2022-08-10 DIAGNOSIS — Z31.69 INFERTILITY COUNSELING: Primary | ICD-10-CM

## 2022-08-13 ENCOUNTER — HOSPITAL ENCOUNTER (OUTPATIENT)
Dept: ULTRASOUND IMAGING | Age: 27
Discharge: HOME | End: 2022-08-15
Payer: COMMERCIAL

## 2022-08-13 DIAGNOSIS — Z31.69 INFERTILITY COUNSELING: ICD-10-CM

## 2022-08-13 PROCEDURE — 76856 US EXAM PELVIC COMPLETE: CPT

## 2022-08-13 PROCEDURE — 76830 TRANSVAGINAL US NON-OB: CPT

## 2022-09-12 DIAGNOSIS — Z31.69 INFERTILITY COUNSELING: Primary | ICD-10-CM

## 2022-10-02 ENCOUNTER — HOSPITAL ENCOUNTER (OUTPATIENT)
Age: 27
Discharge: HOME | End: 2022-10-02
Payer: COMMERCIAL

## 2022-10-02 DIAGNOSIS — Z31.69 INFERTILITY COUNSELING: ICD-10-CM

## 2022-10-02 LAB — PROGESTERONE LEVEL: 0.27 NG/ML

## 2022-10-02 PROCEDURE — 84144 ASSAY OF PROGESTERONE: CPT

## 2022-10-02 PROCEDURE — 36415 COLL VENOUS BLD VENIPUNCTURE: CPT

## 2022-10-04 DIAGNOSIS — Z31.69 INFERTILITY COUNSELING: Primary | ICD-10-CM

## 2022-10-10 ENCOUNTER — HOSPITAL ENCOUNTER (OUTPATIENT)
Age: 27
Setting detail: SPECIMEN
Discharge: HOME | End: 2022-10-10
Payer: COMMERCIAL

## 2022-10-10 DIAGNOSIS — Z31.69 INFERTILITY COUNSELING: ICD-10-CM

## 2022-10-10 LAB — PROGESTERONE LEVEL: 4.07 NG/ML

## 2022-10-10 PROCEDURE — 84144 ASSAY OF PROGESTERONE: CPT

## 2022-10-10 PROCEDURE — 36415 COLL VENOUS BLD VENIPUNCTURE: CPT

## 2022-11-23 ENCOUNTER — HOSPITAL ENCOUNTER (OUTPATIENT)
Age: 27
Discharge: HOME | End: 2022-11-23
Payer: COMMERCIAL

## 2022-11-23 LAB
ALBUMIN SERPL-MCNC: 4 G/DL (ref 3.5–5.2)
ALP BLD-CCNC: 62 U/L (ref 35–104)
ALT SERPL-CCNC: 23 U/L (ref 5–33)
ANION GAP SERPL CALCULATED.3IONS-SCNC: 8 MMOL/L (ref 9–17)
AST SERPL-CCNC: 16 U/L
BILIRUB SERPL-MCNC: 0.3 MG/DL (ref 0.3–1.2)
BUN BLDV-MCNC: 9 MG/DL (ref 6–20)
CALCIUM SERPL-MCNC: 8.9 MG/DL (ref 8.6–10.4)
CHLORIDE BLD-SCNC: 104 MMOL/L (ref 98–107)
CO2: 27 MMOL/L (ref 20–31)
CREAT SERPL-MCNC: 0.55 MG/DL (ref 0.5–0.9)
GFR SERPL CREATININE-BSD FRML MDRD: >60 ML/MIN/1.73M2
GLUCOSE BLD-MCNC: 94 MG/DL (ref 70–99)
HCG QUANTITATIVE: <1 MIU/ML
POTASSIUM SERPL-SCNC: 3.9 MMOL/L (ref 3.7–5.3)
SODIUM BLD-SCNC: 139 MMOL/L (ref 135–144)
THYROXINE, FREE: 1.2 NG/DL (ref 0.93–1.7)
TOTAL PROTEIN: 6.9 G/DL (ref 6.4–8.3)
TSH SERPL DL<=0.05 MIU/L-ACNC: 2.07 UIU/ML (ref 0.3–5)

## 2022-11-23 PROCEDURE — 84443 ASSAY THYROID STIM HORMONE: CPT

## 2022-11-23 PROCEDURE — 80053 COMPREHEN METABOLIC PANEL: CPT

## 2022-11-23 PROCEDURE — 84439 ASSAY OF FREE THYROXINE: CPT

## 2022-11-23 PROCEDURE — 36415 COLL VENOUS BLD VENIPUNCTURE: CPT

## 2022-11-23 PROCEDURE — 83036 HEMOGLOBIN GLYCOSYLATED A1C: CPT

## 2022-11-23 PROCEDURE — 83525 ASSAY OF INSULIN: CPT

## 2022-11-23 PROCEDURE — 84702 CHORIONIC GONADOTROPIN TEST: CPT

## 2022-11-24 LAB
ESTIMATED AVERAGE GLUCOSE: 100 MG/DL
HBA1C MFR BLD: 5.1 % (ref 4–6)
INSULIN COMMENT: NORMAL
INSULIN REFERENCE RANGE:: NORMAL
INSULIN: 88.1 MU/L

## 2023-03-13 ENCOUNTER — INITIAL CONSULT (OUTPATIENT)
Dept: OBGYN CLINIC | Age: 28
End: 2023-03-13
Payer: COMMERCIAL

## 2023-03-13 VITALS
WEIGHT: 293 LBS | BODY MASS INDEX: 56.97 KG/M2 | SYSTOLIC BLOOD PRESSURE: 123 MMHG | DIASTOLIC BLOOD PRESSURE: 85 MMHG | HEART RATE: 82 BPM

## 2023-03-13 DIAGNOSIS — E88.81 INSULIN RESISTANCE: ICD-10-CM

## 2023-03-13 DIAGNOSIS — E28.2 POLYCYSTIC OVARIES: Primary | ICD-10-CM

## 2023-03-13 PROBLEM — E88.819 INSULIN RESISTANCE: Status: ACTIVE | Noted: 2023-03-13

## 2023-03-13 PROBLEM — F32.A DEPRESSIVE DISORDER: Status: ACTIVE | Noted: 2023-03-13

## 2023-03-13 PROBLEM — F41.9 ANXIETY: Status: ACTIVE | Noted: 2023-03-13

## 2023-03-13 PROCEDURE — 99213 OFFICE O/P EST LOW 20 MIN: CPT | Performed by: STUDENT IN AN ORGANIZED HEALTH CARE EDUCATION/TRAINING PROGRAM

## 2023-03-13 NOTE — PROGRESS NOTES
[address] [facility]  [address] [facility], [address] [facility]      Problem Visit      Alec Meyer  3/13/2023                       Primary Care Physician: Art Kilgore MD    CC:   Chief Complaint   Patient presents with    Follow-up         HPI: Alec Meyer is a 28 y.o. female [place] Patient's last menstrual period was 2023. The patient was seen and examined. Patient is here as a follow-up for infertility. Patient does have a history of polycystic ovarian syndrome but is admitting to regular cycles lasting roughly 35 days. Patient is also tracking ovulation and is found that she is ovulating monthly. Patient's partner is following up with urology at [facility] for an abnormal semen analysis. He has a follow-up appointment in April. Recommend that patient continue with healthy lifestyle choices. Patient has lost almost 90 pounds over the last few years and has normalized her cycles as a result. Once appointment with her partner is completed at [facility], can come up with further recommendations.       REVIEW OF SYSTEMS:  Constitutional: negative fever, negative chills  HEENT: negative visual disturbances, negative headaches  Respiratory: negative dyspnea, negative cough  Cardiovascular: negative chest pain,  negative palpitations  Gastrointestinal: negative abdominal pain, negative RUQ pain, negative N/V, negative diarrhea, negative constipation  Genitourinary: negative dysuria, negative vaginal discharge  Dermatological: negative rash  Hematologic: negative bruising  Immunologic/Lymphatic: negative recent illness, negative recent sick contact  Musculoskeletal: negative back pain, negative myalgias, negative arthralgias  Neurological:  negative dizziness, negative weakness  Behavior/Psych: negative depression, negative anxiety    OBSTETRICAL HISTORY:  OB History    Para Term  AB Living   0 0 0 0 0 0   SAB IAB Ectopic Molar Multiple Live Births   0 0 0 0 0 0       PAST MEDICAL HISTORY:  History reviewed. No pertinent past medical history. PAST SURGICAL HISTORY:                                                                    Procedure Laterality Date    TONSILLECTOMY         MEDICATIONS:  Current Outpatient Medications   Medication Sig Dispense Refill    METFORMIN HCL PO Take by mouth      FOLIC ACID PO Take by mouth      Prenatal Vit-Fe Fumarate-FA (PRENATAL PO) Take by mouth       No current facility-administered medications for this visit. ALLERGIES:   Allergies as of 03/13/2023 - Fully Reviewed 03/13/2023   Allergen Reaction Noted    Bee venom Hives, Itching, Rash, Shortness Of Breath, and Swelling 03/13/2023                                   VITALS:  Vitals:    03/13/23 0843 03/13/23 0920   BP: (!) 147/91 123/85   Site: Left Upper Arm Left Lower Arm   Position: Sitting Sitting   Cuff Size: Large Adult Large Adult   Pulse: 87 82   Weight: (!) 391 lb 6.4 oz (177.5 kg)                                                                                                                                                                     PHYSICAL EXAM:   General Appearance: Appears healthy. Alert; in no acute distress. Pleasant. Skin: Skin color, texture, turgor normal. No rashes or lesions. HEENT: normocephalic and atraumatic, Thyroid normal to inspection and palpation  Respiratory: Normal expansion. Clear to auscultation. No rales, rhonchi, or wheezing. Cardiovascular: normal rate, normal S1 and S2, no gallops, intact distal pulses and no carotid bruits  Breast:  (Chest): N/A  Abdomen: soft, non-tender, non-distended, no right upper quadrant tenderness and no CVA tenderness  Pelvic Exam: N/A  Rectal Exam: exam declined by patient  Musculoskeletal: no gross abnormalities  Extremities: non-tender BLE and non-edematous  Psych:  oriented to time, place and person       DATA:  No results found for this visit on 03/13/23.     ASSESSMENT & PLAN:    Moni Akbar is a 28 y.o. female [place] Patient's last menstrual period was 03/01/2023. Infertility follow up   - Plan to RTO once partner has follow up appt with Urology at [facility] for abnormal semen analysis. If still abnormal, plan for [facility] referral.    Patient Active Problem List    Diagnosis Date Noted    Anxiety 03/13/2023     Priority: Medium    Depressive disorder 03/13/2023     Priority: Medium    Insulin resistance 03/13/2023     Priority: Medium    Polycystic ovaries 08/08/2022     Priority: Medium       Return in about 8 weeks (around 5/8/2023) for Annual.    Counseling Completed:    Discussed need for repeat pap as per American Society for Colposcopy and Cervical Pathology guidelines. Discussed need for mammograms every 1 year, If >44 yo and last mammogram was negative. Discussed Calcium and Vitamin D dosing. Discussed need for colonoscopy screening as well as onset for bone density testing. Discussed birth control and barrier recommendations. Discussed STD counseling and prevention. Discussed Gardisil counseling for all patients 10-37 yo. Hereditary Breast, Ovarian, Colon and Uterine Cancer screening discussed. Tobacco & Secondary smoke risks discussed; with recommendation for cessation and avoidance. Routine health maintenance per patients PCP discussed. Patient was seen with total face to face time of 15 minutes. More than 50% of this visit was on counseling and education regarding her diagnose(s) as listed below and options. She was also counseled on her preventative health maintenance recommendations and follow-up. Diagnosis Orders   1. Polycystic ovaries        2.  Insulin resistance              DO Tiffani Alvarado Ob/Gyn   3/13/2023, 10:01 AM

## 2023-05-19 ENCOUNTER — OFFICE VISIT (OUTPATIENT)
Dept: OBGYN CLINIC | Age: 28
End: 2023-05-19

## 2023-05-19 VITALS
WEIGHT: 293 LBS | DIASTOLIC BLOOD PRESSURE: 78 MMHG | HEART RATE: 80 BPM | BODY MASS INDEX: 58.28 KG/M2 | SYSTOLIC BLOOD PRESSURE: 138 MMHG

## 2023-05-19 DIAGNOSIS — E28.2 POLYCYSTIC OVARIES: ICD-10-CM

## 2023-05-19 DIAGNOSIS — Z01.419 WOMEN'S ANNUAL ROUTINE GYNECOLOGICAL EXAMINATION: Primary | ICD-10-CM

## 2023-05-19 NOTE — PROGRESS NOTES
[address] [facility]  [address] [facility], [address] [place] [address]      Alec Meyer  5/19/2023                       Primary Care Physician: Art Kilgore MD    CC:   Chief Complaint   Patient presents with    Annual Exam         HPI: Alec Meyer is a 28 y.o. female [place] Patient's last menstrual period was 05/05/2023. The patient was seen and examined. She is here for an annual visit. She is complaining of nothing. She denies irregular/heavy bleeding and dysmenorrhea. Her periods are regular and last 5 days. She describes them as moderate. Her bowel habits are regular. She denies any bloating. She denies dysuria. She denies urinary leaking. She denies vaginal discharge. She is sexually active with single partner, contraception - none. She uses no method for contraception and is  desiring pregnancy.     Depression Screen: Symptoms of decreased mood absent  Symptoms of anhedonia absent  **If either question is answered in a  positive fashion then complete the PHQ9 Scoring Evaluation and make the appropriate referral**    REVIEW OF SYSTEMS:   Constitutional: negative fever, negative chills  HEENT: negative visual disturbances, negative headaches  Respiratory: negative dyspnea, negative cough  Cardiovascular: negative chest pain,  negative palpitations  Gastrointestinal: negative abdominal pain, negative RUQ pain, negative N/V, negative diarrhea, negative constipation  Genitourinary: negative dysuria, negative vaginal discharge  Dermatological: negative rash  Hematologic: negative bruising  Immunologic/Lymphatic: negative recent illness, negative recent sick contact  Musculoskeletal: negative back pain, negative myalgias, negative arthralgias  Neurological:  negative dizziness, negative weakness  Behavior/Psych: negative depression, negative anxiety      GYNECOLOGICAL HISTORY:  Age of Menarche: 14  Age of Menopause: N/A     STD History: no past history    Permanent

## 2023-05-25 ENCOUNTER — HOSPITAL ENCOUNTER (OUTPATIENT)
Age: 28
Discharge: HOME | End: 2023-05-25
Payer: COMMERCIAL

## 2023-05-25 DIAGNOSIS — E28.2 POLYCYSTIC OVARIES: ICD-10-CM

## 2023-05-25 DIAGNOSIS — Z01.419 WOMEN'S ANNUAL ROUTINE GYNECOLOGICAL EXAMINATION: ICD-10-CM

## 2023-05-25 LAB — PROGEST SERPL-MCNC: 0.08 NG/ML

## 2023-05-25 PROCEDURE — 36415 COLL VENOUS BLD VENIPUNCTURE: CPT

## 2023-05-25 PROCEDURE — 84144 ASSAY OF PROGESTERONE: CPT

## 2023-06-19 DIAGNOSIS — E28.2 PCOS (POLYCYSTIC OVARIAN SYNDROME): Primary | ICD-10-CM

## 2023-06-19 RX ORDER — MEDROXYPROGESTERONE ACETATE 10 MG/1
10 TABLET ORAL DAILY
Qty: 10 TABLET | Refills: 0 | Status: SHIPPED | OUTPATIENT
Start: 2023-06-19

## 2023-08-21 ENCOUNTER — HOSPITAL ENCOUNTER (OUTPATIENT)
Age: 28
Setting detail: SPECIMEN
Discharge: HOME | End: 2023-08-21

## 2023-08-21 LAB
25(OH)D3 SERPL-MCNC: 34 NG/ML
ABO + RH BLD: NORMAL
HBV CORE AB SER QL: NONREACTIVE
HBV SURFACE AG SERPL QL IA: NONREACTIVE
HCV AB SERPL QL IA: NONREACTIVE
HIV 1+2 AB+HIV1 P24 AG SERPL QL IA: NONREACTIVE
PROLACTIN SERPL-MCNC: 8.88 NG/ML (ref 4.79–23.3)
RUBV IGG SERPL QL IA: 25.28 IU/ML
T PALLIDUM AB SER QL IA: NONREACTIVE
TSH SERPL DL<=0.05 MIU/L-ACNC: 2.66 UIU/ML (ref 0.3–5)

## 2023-08-22 LAB
CHLAMYDIA DNA UR QL NAA+PROBE: NEGATIVE
EST. AVERAGE GLUCOSE BLD GHB EST-MCNC: 111 MG/DL
HBA1C MFR BLD: 5.5 % (ref 4–6)
N GONORRHOEA DNA UR QL NAA+PROBE: NEGATIVE
SPECIMEN DESCRIPTION: NORMAL

## 2023-08-23 LAB — VZV IGG SER QL IA: 0.4

## 2023-08-24 LAB — ANTI-MULLERIAN HORMONE: 4.31 NG/ML (ref 0.4–16.02)

## 2023-08-27 LAB
F2 C.20210G>A GENO BLD/T: NEGATIVE
SPECIMEN SOURCE: NORMAL

## 2023-08-28 LAB
F5 P.R506Q BLD/T QL: NEGATIVE
SPECIMEN SOURCE: NORMAL

## 2023-08-29 LAB
ABO + RH BLD: NORMAL
BLOOD GROUP ANTIBODIES SERPL: NEGATIVE

## 2024-05-28 ENCOUNTER — HOSPITAL ENCOUNTER (OUTPATIENT)
Age: 29
Setting detail: SPECIMEN
Discharge: HOME | End: 2024-05-28

## 2024-05-28 ENCOUNTER — OFFICE VISIT (OUTPATIENT)
Dept: OBGYN CLINIC | Age: 29
End: 2024-05-28
Payer: COMMERCIAL

## 2024-05-28 VITALS
BODY MASS INDEX: 43.4 KG/M2 | DIASTOLIC BLOOD PRESSURE: 77 MMHG | HEIGHT: 69 IN | HEART RATE: 72 BPM | WEIGHT: 293 LBS | SYSTOLIC BLOOD PRESSURE: 123 MMHG

## 2024-05-28 DIAGNOSIS — Z30.09 ENCOUNTER FOR COUNSELING REGARDING CONTRACEPTION: ICD-10-CM

## 2024-05-28 DIAGNOSIS — Z31.69 INFERTILITY COUNSELING: Primary | ICD-10-CM

## 2024-05-28 PROCEDURE — 99395 PREV VISIT EST AGE 18-39: CPT

## 2024-05-28 NOTE — PROGRESS NOTES
[facility] [place]  [address]  [address]  [facility] [address]  Dept: [phone]           Annual gynecologic exam    Patient: Christelle Ferrera  Primary Care Physician: Geraldine Pa MD   Chief Complaint   Patient presents with    Annual Exam     Talk about infertility     HPI: Christelle Ferrera is a 28 y.o.  who presents today as a returning patient for her annual women's wellness exam. She has no children. She works as  at [employer]. For activity, she walks her dog regularly. Also has a home treadmill. Does youtube videos at home for exercise as well. She is doing another round of the [employer] diet through [employer] - she has lost 45 lbs since February.    She has previously discussed infertility concerns with Dr. Dean at this clinic. Has been trying to conceive for about 4 years.  Per the records \"she has regular cycles every 35 days and is ovulating according to her home ovulation predictor kit use.\" Pelvic ultrasound completed in 2022 and unremarkable aside from suggestive of polycystic R ovary (L ovary not visualized).    Christelle has a known history of PCOS and states previous testing has indicated she is ovulating but not with every cycle. She and her  have an appointment with Ivf physician  Dr. Lynne - who she states is recommending IUI.      OBSTETRICAL & GYNECOLOGICAL HISTORY:  Age of Menarche: 11   Her periods are somewhat irregular, and last 5 days.   Last several periods started on the following dateS:   -  -  -  -No period in April  -May 2    Bleeding is heavy for first 2 days. Does not regularly soak pads or tampons in 2h or less.   She complains of dysmenorrhea. Has hormonal acne and headaches the week before period starts. Ibuprofen helps.   Patient's last menstrual period was 2024.  Sexually Active: with   Dyspareunia: No  STD History: No  Birth Control:

## 2024-06-02 LAB — CYTOLOGY REPORT: NORMAL

## 2025-03-13 ENCOUNTER — TELEPHONE (OUTPATIENT)
Dept: OBGYN CLINIC | Age: 30
End: 2025-03-13

## 2025-04-15 ENCOUNTER — OFFICE VISIT (OUTPATIENT)
Dept: OBGYN CLINIC | Age: 30
End: 2025-04-15
Payer: COMMERCIAL

## 2025-04-15 VITALS
HEART RATE: 86 BPM | DIASTOLIC BLOOD PRESSURE: 80 MMHG | BODY MASS INDEX: 59.22 KG/M2 | SYSTOLIC BLOOD PRESSURE: 138 MMHG | WEIGHT: 293 LBS

## 2025-04-15 DIAGNOSIS — Z01.419 WOMEN'S ANNUAL ROUTINE GYNECOLOGICAL EXAMINATION: Primary | ICD-10-CM

## 2025-04-15 PROCEDURE — 99459 PELVIC EXAMINATION: CPT | Performed by: STUDENT IN AN ORGANIZED HEALTH CARE EDUCATION/TRAINING PROGRAM

## 2025-04-15 PROCEDURE — 99395 PREV VISIT EST AGE 18-39: CPT | Performed by: STUDENT IN AN ORGANIZED HEALTH CARE EDUCATION/TRAINING PROGRAM

## 2025-04-15 RX ORDER — PHENTERMINE HYDROCHLORIDE 37.5 MG/1
37.5 TABLET ORAL
COMMUNITY
Start: 2024-01-26

## 2025-04-15 NOTE — PROGRESS NOTES
history    Permanent Sterilization: no  Reversible Birth Control: no  Hormone Replacement Exposure: no    OBSTETRICAL HISTORY:  OB History    Para Term  AB Living   0 0 0 0 0 0   SAB IAB Ectopic Molar Multiple Live Births   0 0 0 0 0 0       PAST MEDICAL HISTORY:   has no past medical history on file.    PAST SURGICAL HISTORY:   has a past surgical history that includes Tonsillectomy.    ALLERGIES:  is allergic to bee venom.    MEDICATIONS:  Prior to Admission medications    Medication Sig Start Date End Date Taking? Authorizing Provider   phentermine (ADIPEX-P) 37.5 MG tablet Take 1 tablet by mouth every morning (before breakfast). 24  Yes Becca Mobley MD   METFORMIN HCL PO Take by mouth   Yes Becca Mobley MD   FOLIC ACID PO Take by mouth   Yes Becca Mobley MD   Prenatal Vit-Fe Fumarate-FA (PRENATAL PO) Take by mouth   Yes Becca Mobley MD       FAMILY HISTORY:  Family History of Breast, Ovarian, Colon or Uterine Cancer:   family history includes Cancer in her paternal grandmother; Diabetes in her father, mother, and paternal grandmother; Heart Attack in her father; Heart Disease in her maternal grandfather; Hypertension in her father and mother; No Known Problems in her maternal grandmother.    SOCIAL HISTORY:   reports that she has been smoking cigarettes. She has never used smokeless tobacco. She reports current alcohol use. She reports that she does not use drugs.    HEALTH MAINTENANCE:  Immunization status: up to date and documented    ROUTINE GYN HEALTH MAINTENANCE  Mammogram: N/A  Colonoscopy: N/A  Pap Smears: Last  Neg Next due .  Family Planning: None  DEXA: N/A    VITALS:  Vitals:    04/15/25 1519   BP: 138/80   BP Site: Right Upper Arm   Patient Position: Sitting   BP Cuff Size: Large Adult   Pulse: 86   Weight: (!) 181.9 kg (401 lb)